# Patient Record
(demographics unavailable — no encounter records)

---

## 2025-05-01 NOTE — IMAGING
[de-identified] : CSPINE Inspection: No rash or ecchymosis Palpation: spasm and TTP in traps, rhomboids, paracervicals ROM: Limited all planes Strength: 5/5 bilateral deltoid, biceps, triceps, wrist flexors, wrist extensors, , abductors Sensation: Sensation present to light touch bilateral C5-T1 distributions Reflexes: Negative Cam's bilaterally  LSPINE Inspection: No rash or ecchymosis Palpation: No tenderness to palpation or spasm in bilateral thoracic and lumbar paraspinal musculature, no SI joint tenderness to palpation ROM: Full with no pain Strength: 5/5 bilateral hip flexors, knee extensors, ankle dorsiflexors, EHL, ankle plantarflexors Sensation: Sensation present to light touch bilateral L2-S1 distributions Provocative maneuvers: Negative bilateral straight leg raise [Facet arthropathy] : Facet arthropathy [Disc space narrowing] : Disc space narrowing

## 2025-05-01 NOTE — ASSESSMENT
[FreeTextEntry1] : PT for posture training and core strengthening PCP & cardiologist f/up for chronic fatigue OTC meds PRN

## 2025-05-01 NOTE — HISTORY OF PRESENT ILLNESS
[Mid-back] : mid-back [7] : 7 [Dull/Aching] : dull/aching [Radiating] : radiating [Sharp] : sharp [Constant] : constant [Household chores] : household chores [Leisure] : leisure [Work] : work [Social interactions] : social interactions [Nothing helps with pain getting better] : Nothing helps with pain getting better [de-identified] : 05/01/2025: 80 year old male presents with chronic neck and mid back pain. Symptoms have been present x 1 year. Patient reports left sided neck and periscapular pain and spasm. He has pain when standing for extended time. Also has mid to low back pain with activity. He denies upper or lower extremity radicular pain, n/t. Reports fatigue in extremities when standing or walking for extended time. Patient utilizes cane for stability.  Completed in and out patient rehabilitation after CVA which helped with symptoms PmHX: CVA with residual fatigue syndrome, Left ventricular dysfunction. on ASA 81. HTN, HLD,, ESTRADA, PVCs, PACs All: PCN (anaphylaxis) Occupation: Rabbi [] : no [FreeTextEntry7] : Up into his shoulders

## 2025-06-26 NOTE — HISTORY OF PRESENT ILLNESS
[Mid-back] : mid-back [7] : 7 [Dull/Aching] : dull/aching [Radiating] : radiating [Sharp] : sharp [Constant] : constant [Household chores] : household chores [Leisure] : leisure [Work] : work [Social interactions] : social interactions [Nothing helps with pain getting better] : Nothing helps with pain getting better [de-identified] : 05/01/2025: 80 year old male presents with chronic neck and mid back pain. Symptoms have been present x 1 year. Patient reports left sided neck and periscapular pain and spasm. He has pain when standing for extended time. Also has mid to low back pain with activity. He denies upper or lower extremity radicular pain, n/t. Reports fatigue in extremities when standing or walking for extended time. Patient utilizes cane for stability.  Completed in and out patient rehabilitation after CVA which helped with symptoms PmHX: CVA with residual fatigue syndrome, Left ventricular dysfunction. on ASA 81. 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 Patient reports he has good and bad days. Some days he has pain and some days he does not. R jose m-scapular pain persists. Able to walk 2 miles before his back hurts.  [] : no [FreeTextEntry7] : Up into his shoulders

## 2025-06-26 NOTE — IMAGING
[Facet arthropathy] : Facet arthropathy [Disc space narrowing] : Disc space narrowing [de-identified] : CSPINE Inspection: No rash or ecchymosis Palpation: spasm and TTP in traps, rhomboids, paracervicals ROM: Limited all planes Strength: 5/5 bilateral deltoid, biceps, triceps, wrist flexors, wrist extensors, , abductors Sensation: Sensation present to light touch bilateral C5-T1 distributions Reflexes: Negative Cam's bilaterally  LSPINE Palpation: No tenderness to palpation or spasm in bilateral thoracic and lumbar paraspinal musculature, no SI joint tenderness to palpation ROM: Full with no pain Strength: 5/5 bilateral hip flexors, knee extensors, ankle dorsiflexors, EHL, ankle plantarflexors Sensation: Sensation present to light touch bilateral L2-S1 distributions Provocative maneuvers: Negative bilateral straight leg raise